# Patient Record
Sex: MALE | URBAN - NONMETROPOLITAN AREA
[De-identification: names, ages, dates, MRNs, and addresses within clinical notes are randomized per-mention and may not be internally consistent; named-entity substitution may affect disease eponyms.]

---

## 2018-09-12 ENCOUNTER — HISTORICAL (OUTPATIENT)
Dept: ADMINISTRATIVE | Facility: HOSPITAL | Age: 38
End: 2018-09-12

## 2021-03-24 ENCOUNTER — HISTORICAL (OUTPATIENT)
Dept: ADMINISTRATIVE | Facility: HOSPITAL | Age: 41
End: 2021-03-24

## 2025-04-22 ENCOUNTER — HOSPITAL ENCOUNTER (EMERGENCY)
Facility: HOSPITAL | Age: 45
Discharge: HOME | End: 2025-04-22
Payer: MEDICAID

## 2025-04-22 VITALS
TEMPERATURE: 98 F | SYSTOLIC BLOOD PRESSURE: 134 MMHG | HEART RATE: 60 BPM | DIASTOLIC BLOOD PRESSURE: 89 MMHG | OXYGEN SATURATION: 98 % | HEIGHT: 73 IN | WEIGHT: 235 LBS | RESPIRATION RATE: 14 BRPM | BODY MASS INDEX: 31.14 KG/M2

## 2025-04-22 DIAGNOSIS — R06.02 SHORTNESS OF BREATH: ICD-10-CM

## 2025-04-22 DIAGNOSIS — F41.9 ANXIETY: ICD-10-CM

## 2025-04-22 DIAGNOSIS — F15.10 METHAMPHETAMINE ABUSE: Primary | ICD-10-CM

## 2025-04-22 LAB
ALBUMIN SERPL-MCNC: 4 G/DL (ref 3.4–5)
ALBUMIN/GLOB SERPL: 1.3 RATIO
ALP SERPL-CCNC: 77 UNIT/L (ref 50–144)
ALT SERPL-CCNC: 76 UNIT/L (ref 1–45)
AMPHET UR QL SCN: NEGATIVE
ANION GAP SERPL CALC-SCNC: 4 MEQ/L (ref 2–13)
AST SERPL-CCNC: 55 UNIT/L (ref 17–59)
BARBITURATE SCN PRESENT UR: NEGATIVE
BASOPHILS # BLD AUTO: 0.05 X10(3)/MCL (ref 0.01–0.08)
BASOPHILS NFR BLD AUTO: 0.6 % (ref 0.1–1.2)
BENZODIAZ UR QL SCN: NEGATIVE
BILIRUB SERPL-MCNC: 0.4 MG/DL (ref 0–1)
BILIRUB UR QL STRIP.AUTO: NEGATIVE
BNP BLD-MCNC: 20.3 PG/ML (ref 0–124.9)
BUN SERPL-MCNC: 15 MG/DL (ref 7–20)
CALCIUM SERPL-MCNC: 9.2 MG/DL (ref 8.4–10.2)
CANNABINOIDS UR QL SCN: NEGATIVE
CHLORIDE SERPL-SCNC: 108 MMOL/L (ref 98–110)
CLARITY UR: CLEAR
CO2 SERPL-SCNC: 27 MMOL/L (ref 21–32)
COCAINE UR QL SCN: NEGATIVE
COLOR UR AUTO: YELLOW
CREAT SERPL-MCNC: 0.75 MG/DL (ref 0.66–1.25)
CREAT/UREA NIT SERPL: 20 (ref 12–20)
EOSINOPHIL # BLD AUTO: 0.41 X10(3)/MCL (ref 0.04–0.54)
EOSINOPHIL NFR BLD AUTO: 5.3 % (ref 0.7–7)
ERYTHROCYTE [DISTWIDTH] IN BLOOD BY AUTOMATED COUNT: 13.2 %
GFR SERPLBLD CREATININE-BSD FMLA CKD-EPI: >90 ML/MIN/1.73/M2
GLOBULIN SER-MCNC: 3.2 GM/DL (ref 2–3.9)
GLUCOSE SERPL-MCNC: 112 MG/DL (ref 70–115)
GLUCOSE UR QL STRIP: NEGATIVE
HCT VFR BLD AUTO: 44.1 % (ref 36–52)
HGB BLD-MCNC: 14.7 G/DL (ref 13–18)
HGB UR QL STRIP: NEGATIVE
IMM GRANULOCYTES # BLD AUTO: 0.08 X10(3)/MCL (ref 0–0.03)
IMM GRANULOCYTES NFR BLD AUTO: 1 % (ref 0–0.5)
KETONES UR QL STRIP: NEGATIVE
LEUKOCYTE ESTERASE UR QL STRIP: NEGATIVE
LYMPHOCYTES # BLD AUTO: 2.32 X10(3)/MCL (ref 1.32–3.57)
LYMPHOCYTES NFR BLD AUTO: 29.9 % (ref 20–55)
MAGNESIUM SERPL-MCNC: 1.9 MG/DL (ref 1.8–2.4)
MCH RBC QN AUTO: 28.9 PG (ref 27–34)
MCHC RBC AUTO-ENTMCNC: 33.3 G/DL (ref 31–37)
MCV RBC AUTO: 86.8 FL (ref 79–99)
METHADONE UR QL SCN: NEGATIVE
MONOCYTES # BLD AUTO: 0.51 X10(3)/MCL (ref 0.3–0.82)
MONOCYTES NFR BLD AUTO: 6.6 % (ref 4.7–12.5)
NEUTROPHILS # BLD AUTO: 4.39 X10(3)/MCL (ref 1.78–5.38)
NEUTROPHILS NFR BLD AUTO: 56.6 % (ref 37–73)
NITRITE UR QL STRIP: NEGATIVE
NRBC BLD AUTO-RTO: 0 %
OPIATES UR QL SCN: NEGATIVE
PCP UR QL: NEGATIVE
PH UR STRIP: 8.5 [PH]
PH UR: 8.5 [PH] (ref 5–8)
PLATELET # BLD AUTO: 191 X10(3)/MCL (ref 140–371)
PMV BLD AUTO: 9.4 FL (ref 9.4–12.4)
POTASSIUM SERPL-SCNC: 4 MMOL/L (ref 3.5–5.1)
PROT SERPL-MCNC: 7.2 GM/DL (ref 6.3–8.2)
PROT UR QL STRIP: NEGATIVE
RBC # BLD AUTO: 5.08 X10(6)/MCL (ref 4–6)
SODIUM SERPL-SCNC: 139 MMOL/L (ref 136–145)
SP GR UR STRIP.AUTO: 1.02 (ref 1–1.03)
TROPONIN I SERPL-MCNC: <0.012 NG/ML (ref 0–0.03)
TSH SERPL-ACNC: 1.45 UIU/ML (ref 0.36–3.74)
UROBILINOGEN UR STRIP-ACNC: 0.2
WBC # BLD AUTO: 7.76 X10(3)/MCL (ref 4–11.5)

## 2025-04-22 PROCEDURE — 83880 ASSAY OF NATRIURETIC PEPTIDE: CPT

## 2025-04-22 PROCEDURE — 81003 URINALYSIS AUTO W/O SCOPE: CPT

## 2025-04-22 PROCEDURE — 83735 ASSAY OF MAGNESIUM: CPT

## 2025-04-22 PROCEDURE — 80307 DRUG TEST PRSMV CHEM ANLYZR: CPT

## 2025-04-22 PROCEDURE — 93010 ELECTROCARDIOGRAM REPORT: CPT | Mod: ,,, | Performed by: INTERNAL MEDICINE

## 2025-04-22 PROCEDURE — 84443 ASSAY THYROID STIM HORMONE: CPT

## 2025-04-22 PROCEDURE — 93005 ELECTROCARDIOGRAM TRACING: CPT

## 2025-04-22 PROCEDURE — 99285 EMERGENCY DEPT VISIT HI MDM: CPT | Mod: 25

## 2025-04-22 PROCEDURE — 80053 COMPREHEN METABOLIC PANEL: CPT

## 2025-04-22 PROCEDURE — 85025 COMPLETE CBC W/AUTO DIFF WBC: CPT

## 2025-04-22 PROCEDURE — 25000003 PHARM REV CODE 250

## 2025-04-22 PROCEDURE — 84484 ASSAY OF TROPONIN QUANT: CPT

## 2025-04-22 RX ORDER — HYDROXYZINE PAMOATE 50 MG/1
50 CAPSULE ORAL EVERY 6 HOURS PRN
Qty: 20 CAPSULE | Refills: 0 | Status: SHIPPED | OUTPATIENT
Start: 2025-04-22

## 2025-04-22 RX ORDER — ALPRAZOLAM 0.5 MG/1
1 TABLET ORAL
Status: COMPLETED | OUTPATIENT
Start: 2025-04-22 | End: 2025-04-22

## 2025-04-22 RX ORDER — BUSPIRONE HYDROCHLORIDE 10 MG/1
10 TABLET ORAL 3 TIMES DAILY PRN
Qty: 90 TABLET | Refills: 0 | Status: SHIPPED | OUTPATIENT
Start: 2025-04-22 | End: 2025-05-22

## 2025-04-22 RX ADMIN — ALPRAZOLAM 1 MG: 0.5 TABLET ORAL at 01:04

## 2025-04-22 NOTE — ED NOTES
Pt states he is a daily methamphetamine user who injects into veins, he states he hasn't felt good since Friday so he has not used since Friday. Does not feel as if he is withdrawing.

## 2025-04-22 NOTE — ED PROVIDER NOTES
"Encounter Date: 4/22/2025       History     Chief Complaint   Patient presents with    Shortness of Breath     Pt reports SOB since Friday with neck pain and swollen hands, "something doesn't feel right".     44-year-old male presents with anxiety, mild shortness of breath, tingling in hands, in a sensation of "feeling bloated".  He is a methamphetamine addict, and does meth "24/7, 365, for the past 12 years".  He has been shooting up meth for the past 2 years.  Today's symptoms started after shooting up meth on Friday.  He has felt so bad since, that he has not done meth all weekend.  He denies any fever or chills.  There has been no chest pain.  "I just feel like something is wrong".    The history is provided by the patient.     Review of patient's allergies indicates:  No Known Allergies  History reviewed. No pertinent past medical history.  History reviewed. No pertinent surgical history.  No family history on file.  Social History[1]  Review of Systems   Constitutional:  Negative for fever.        Feels bloated   HENT:  Negative for sore throat.    Respiratory:  Positive for shortness of breath.    Cardiovascular:  Negative for chest pain.   Gastrointestinal:  Negative for nausea.   Genitourinary:  Negative for dysuria.   Musculoskeletal:  Negative for back pain.   Skin:  Negative for rash.   Neurological:  Negative for weakness.        Paresthesias   Hematological:  Does not bruise/bleed easily.   Psychiatric/Behavioral:  The patient is nervous/anxious.    All other systems reviewed and are negative.      Physical Exam     Initial Vitals [04/22/25 1159]   BP Pulse Resp Temp SpO2   (!) 173/105 74 (!) 24 98 °F (36.7 °C) 98 %      MAP       --         Physical Exam    Nursing note and vitals reviewed.  Constitutional: Vital signs are normal. He appears well-developed and well-nourished. He is cooperative.   HENT:   Head: Normocephalic and atraumatic. Mouth/Throat: Oropharynx is clear and moist.   Eyes: " Conjunctivae, EOM and lids are normal. Pupils are equal, round, and reactive to light.   Neck: Trachea normal. Neck supple.   Normal range of motion.  Cardiovascular:  Normal rate, regular rhythm, normal heart sounds and intact distal pulses.           Pulmonary/Chest: Breath sounds normal.   Abdominal: Abdomen is soft. Bowel sounds are normal.   Musculoskeletal:         General: Edema present. Normal range of motion.      Cervical back: Normal, normal range of motion and neck supple.      Lumbar back: Normal.      Comments: 1+ edema bilaterally     Neurological: He is alert and oriented to person, place, and time. He has normal strength. Coordination normal. GCS score is 15. GCS eye subscore is 4. GCS verbal subscore is 5. GCS motor subscore is 6.   Skin: Skin is warm, dry and intact. Capillary refill takes less than 2 seconds.   Psychiatric: His speech is normal and behavior is normal. Judgment and thought content normal. Cognition and memory are normal.   He is anxious         ED Course   Procedures  Labs Reviewed   DRUG SCREEN, URINE (BEAKER) - Abnormal       Result Value    Amphetamines, Urine Negative      Barbiturates, Urine Negative      Benzodiazepine, Urine Negative      Cannabinoids, Urine Negative      Cocaine, Urine Negative      Opiates, Urine Negative      Phencyclidine, Urine Negative      Methadone, Urine Negative      pH, Urine 8.5 (*)     Narrative:     Cut off concentrations:    Amphetamines - 1000 ng/ml  Barbiturates - 200 ng/ml  Benzodiazepine - 200 ng/ml  Cannabinoids (THC) - 50 ng/ml  Cocaine - 300 ng/ml  Fentanyl - 1.0 ng/ml  MDMA - 500 ng/ml  Opiates - 300 ng/ml   Phencyclidine (PCP) - 25 ng/ml  Methadone - 300 ng/ml      False negatives may result form substances such as bleach added to urine.  False positives may result for the presence of a substance with similar chemical structure to the drug or its metabolite.    This test provides only a PRELIMINARY analytical test result. A more  specific alternate chemical method must be used in order to obtain a confirmed analytical result. Gas chromatography/mass spectrometry (GC/MS) is the preferred confirmatory method. Other chemical confirmation methods are available. Clinical consideration and professional judgement should be applied to any drug of abuse test result, particularly when preliminary positive results are used.    Positive results will be confirmed only at the physicians request. Unconfirmed screening results are to be used only for medical purposes (treatment).          COMPREHENSIVE METABOLIC PANEL - Abnormal    Sodium 139      Potassium 4.0      Chloride 108      CO2 27      Glucose 112      Blood Urea Nitrogen 15      Creatinine 0.75      Calcium 9.2      Protein Total 7.2      Albumin 4.0      Globulin 3.2      Albumin/Globulin Ratio 1.3      Bilirubin Total 0.4      ALP 77      ALT 76 (*)     AST 55      eGFR >90      Anion Gap 4.0      BUN/Creatinine Ratio 20     CBC WITH DIFFERENTIAL - Abnormal    WBC 7.76      RBC 5.08      Hgb 14.7      Hct 44.1      MCV 86.8      MCH 28.9      MCHC 33.3      RDW 13.2      Platelet 191      MPV 9.4      Neut % 56.6      Lymph % 29.9      Mono % 6.6      Eos % 5.3      Basophil % 0.6      Lymph # 2.32      Neut # 4.39      Mono # 0.51      Eos # 0.41      Baso # 0.05      Imm Gran # 0.08 (*)     Imm Grans % 1.0 (*)     NRBC% 0.0     URINALYSIS, REFLEX TO URINE CULTURE - Normal    Color, UA Yellow      Appearance, UA Clear      Specific Gravity, UA 1.020      pH, UA 8.5      Protein, UA Negative      Glucose, UA Negative      Ketones, UA Negative      Blood, UA Negative      Bilirubin, UA Negative      Urobilinogen, UA 0.2      Nitrites, UA Negative      Leukocyte Esterase, UA Negative      Narrative:      URINE STABILITY IS 2 HOURS AT ROOM TEMP OR    SIX HOURS REFRIGERATED. PERFORMING TESTING ON    SPECIMENS GREATER THAN THIS AGE MAY AFFECT THE    FOLLOWING TESTS:    PH          SPECIFIC GRAVITY            BLOOD    CLARITY     BILIRUBIN               UROBILINOGEN   TROPONIN I - Normal    Troponin-I <0.012     NT-PRO NATRIURETIC PEPTIDE - Normal    ProBNP 20.3     MAGNESIUM - Normal    Magnesium Level 1.90     CBC W/ AUTO DIFFERENTIAL    Narrative:     The following orders were created for panel order CBC auto differential.  Procedure                               Abnormality         Status                     ---------                               -----------         ------                     CBC with Differential[[id number]]       Abnormal            Final result                 Please view results for these tests on the individual orders.   TSH        ECG Results              EKG 12-lead (Preliminary result)  Result time 04/22/25 12:10:17      Wet Read by Quincy Garcia MD (04/22/25 12:10:17, [facility], Emergency Medicine)    Normal sinus rhythm, heart rate 64, there is a left anterior fascicular block, normal P waves, slightly widened QRS, ST and T-waves appear normal, normal QT.                                  Imaging Results              X-Ray Chest AP Portable (Final result)  Result time 04/22/25 12:29:40      Final result by Jero Delacruz III, MD (04/22/25 12:29:40)                   Impression:      1. I see no lobar or segmental infiltrates or other significant abnormalities.      Electronically signed by: Jero Delacruz  Date:    04/22/2025  Time:    12:29               Narrative:    EXAMINATION:  STUDY: XR CHEST AP PORTABLE    CLINICAL HISTORY AND TECHNIQUE:  Shortness of breath    COMPARISON:  None    FINDINGS:  The lungs are slightly under expanded.The cardiac, hilar, and mediastinal contours appear unremarkable.I see no lobar or segmental infiltrates.No significant pleural effusions are noted.No significant musculoskeletal or vascular abnormalities are appreciated.                                       Medications   ALPRAZolam tablet 1 mg (has no administration in  "time range)     Medical Decision Making  Differential diagnosis:  Congestive heart failure, ACS, occult infection, anemia, liver failure, renal failure, anxiety, substance abuse  CHF workup, urinalysis, UDS    Amount and/or Complexity of Data Reviewed  Labs: ordered.  Radiology: ordered.  Discussion of management or test interpretation with external provider(s): Entire workup is completely negative.  Patient's symptoms appear to be due to anxiety, or "bad meth".  Xanax p.o.    Risk  Prescription drug management.                                      Clinical Impression:  Final diagnoses:  [F15.10] Methamphetamine abuse (Primary)  [F41.9] Anxiety          ED Disposition Condition    Discharge Stable          ED Prescriptions       Medication Sig Dispense Start Date End Date Auth. Provider    busPIRone (BUSPAR) 10 MG tablet Take 1 tablet (10 mg total) by mouth 3 (three) times daily as needed (Anxiety). 90 tablet 4/22/2025 5/22/2025 Quincy Garcia MD    hydrOXYzine pamoate (VISTARIL) 50 MG Cap Take 1 capsule (50 mg total) by mouth every 6 (six) hours as needed (Anxiety). 20 capsule 4/22/2025 -- Quincy Garcia MD          Follow-up Information       Follow up With Specialties Details Why Contact Info    PCP  Call today                   [1]   Social History  Tobacco Use    Smoking status: Every Day     Current packs/day: 0.50     Average packs/day: 0.5 packs/day for 26.3 years (13.2 ttl pk-yrs)     Types: Cigarettes     Start date: 1999    Smokeless tobacco: Never   Substance Use Topics    Alcohol use: Not Currently    Drug use: Yes     Types: Methamphetamines        Quincy Garcia MD  04/22/25 1321    "

## 2025-04-22 NOTE — ED NOTES
Pt very anxious and states he is having difficulty breathing. Pt breathing rapidly and coached on slowing respirations. Dr Garcia informed.

## 2025-04-22 NOTE — ED NOTES
IV attempt x 2 right AC and inner arm. Veronica JUAREZ In room attempting to get IV at this time.

## 2025-04-22 NOTE — DISCHARGE INSTRUCTIONS
Stay off the meth.  Take the medications as needed for anxiety.  Call your doctor today for follow up

## 2025-04-23 LAB
OHS QRS DURATION: 98 MS
OHS QTC CALCULATION: 387 MS

## 2025-05-30 ENCOUNTER — HOSPITAL ENCOUNTER (EMERGENCY)
Facility: HOSPITAL | Age: 45
Discharge: HOME | End: 2025-05-30
Attending: INTERNAL MEDICINE
Payer: MEDICAID

## 2025-05-30 VITALS
SYSTOLIC BLOOD PRESSURE: 124 MMHG | RESPIRATION RATE: 16 BRPM | DIASTOLIC BLOOD PRESSURE: 71 MMHG | WEIGHT: 240.5 LBS | TEMPERATURE: 98 F | OXYGEN SATURATION: 96 % | HEART RATE: 78 BPM | HEIGHT: 73 IN | BODY MASS INDEX: 31.87 KG/M2

## 2025-05-30 DIAGNOSIS — J20.9 ACUTE BRONCHITIS, UNSPECIFIED ORGANISM: Primary | ICD-10-CM

## 2025-05-30 DIAGNOSIS — R06.02 SHORTNESS OF BREATH: ICD-10-CM

## 2025-05-30 PROCEDURE — 99283 EMERGENCY DEPT VISIT LOW MDM: CPT | Mod: 25
